# Patient Record
Sex: MALE | NOT HISPANIC OR LATINO
[De-identification: names, ages, dates, MRNs, and addresses within clinical notes are randomized per-mention and may not be internally consistent; named-entity substitution may affect disease eponyms.]

---

## 2019-01-04 ENCOUNTER — TRANSCRIPTION ENCOUNTER (OUTPATIENT)
Age: 39
End: 2019-01-04

## 2022-05-09 PROBLEM — Z00.00 ENCOUNTER FOR PREVENTIVE HEALTH EXAMINATION: Status: ACTIVE | Noted: 2022-05-09

## 2022-05-16 ENCOUNTER — TRANSCRIPTION ENCOUNTER (OUTPATIENT)
Age: 42
End: 2022-05-16

## 2022-05-16 ENCOUNTER — APPOINTMENT (OUTPATIENT)
Dept: VASCULAR SURGERY | Facility: CLINIC | Age: 42
End: 2022-05-16
Payer: COMMERCIAL

## 2022-05-16 VITALS
DIASTOLIC BLOOD PRESSURE: 79 MMHG | BODY MASS INDEX: 34.3 KG/M2 | SYSTOLIC BLOOD PRESSURE: 120 MMHG | HEART RATE: 81 BPM | WEIGHT: 245 LBS | HEIGHT: 71 IN

## 2022-05-16 DIAGNOSIS — I83.891 VARICOSE VEINS OF RIGHT LOWER EXTREMITY WITH OTHER COMPLICATIONS: ICD-10-CM

## 2022-05-16 DIAGNOSIS — I82.531 CHRONIC EMBOLISM AND THROMBOSIS OF RIGHT POPLITEAL VEIN: ICD-10-CM

## 2022-05-16 DIAGNOSIS — M25.561 PAIN IN RIGHT KNEE: ICD-10-CM

## 2022-05-16 DIAGNOSIS — Z78.9 OTHER SPECIFIED HEALTH STATUS: ICD-10-CM

## 2022-05-16 DIAGNOSIS — G89.29 PAIN IN RIGHT KNEE: ICD-10-CM

## 2022-05-16 PROCEDURE — 93971 EXTREMITY STUDY: CPT

## 2022-05-16 PROCEDURE — 99204 OFFICE O/P NEW MOD 45 MIN: CPT

## 2022-05-16 RX ORDER — OMEPRAZOLE 20 MG/1
20 CAPSULE, DELAYED RELEASE ORAL
Refills: 0 | Status: ACTIVE | COMMUNITY

## 2022-05-16 RX ORDER — IRON/IRON ASP GLY/FA/MV-MIN 38 125-25-1MG
TABLET ORAL
Refills: 0 | Status: ACTIVE | COMMUNITY

## 2022-05-16 RX ORDER — SERTRALINE HYDROCHLORIDE 150 MG/1
CAPSULE ORAL
Refills: 0 | Status: ACTIVE | COMMUNITY

## 2022-06-23 NOTE — PHYSICAL EXAM
[Normal Thyroid] : the thyroid was normal [Normal Breath Sounds] : Normal breath sounds [Respiratory Effort] : normal respiratory effort [Normal Heart Sounds] : normal heart sounds [Normal Rate and Rhythm] : normal rate and rhythm [2+] : left 2+ [Varicose Veins Of Lower Extremities] : present [Varicose Veins Of The Right Leg] : of the right leg [Ankle Swelling On The Left] : moderate [No HSM] : no hepatosplenomegaly [No Rash or Lesion] : No rash or lesion [Alert] : alert [Anxious] : anxious [JVD] : no jugular venous distention  [Carotid Bruits] : no carotid bruits [Right Carotid Bruit] : no bruit heard over the right carotid [Left Carotid Bruit] : no bruit heard over the left carotid [Ankle Swelling (On Exam)] : not present [] : not present [Abdomen Masses] : No abdominal masses [Abdomen Tenderness] : ~T ~M No abdominal tenderness [Purpura] : no purpura  [Petechiae] : no petechiae [Skin Ulcer] : no ulcer [Skin Induration] : no induration [de-identified] : Well-nourished, NAD [de-identified] : NC/AT, anicteric [de-identified] : No hernias noted b/l [de-identified] : FROM throughout, but pain w/flexion of the R knee and reproducible tenderness in the lateral R pop fossa, no palpable cords noted on exam [de-identified] : Neurovascular grossly intact in RLE

## 2022-06-23 NOTE — ADDENDUM
[FreeTextEntry1] : This note was written by Moisés Vargas, acting as a scribe for Dr. Anusha Meeks.  I, Dr. Anusha Meeks, have read and attest that all the information, medical decision-making, and discharge instructions within are true and accurate.\par \par I, Dr. Anusha Meeks, personally performed the evaluation and management (E/M) services for this new patient.  That E/M includes conducting the initial examination, assessing all conditions, and establishing the plan of care.  Today, my ACP, Moisés Vargas, was here to observe my evaluation and management services for this patient to be followed going forward.

## 2022-06-23 NOTE — PROCEDURE
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

## 2022-06-23 NOTE — HISTORY OF PRESENT ILLNESS
[FreeTextEntry1] : 41yoM w/h/o RLE DVT following a R knee arthroscopy/meniscus repair in  (treated w/DOAC), also s/p L THR, gastric sleeve, presents for chronic pain behind the R knee that began w/o cause in 2021 and has persisted since that time.  As the pain did not resolve initially w/rest, he presented for RLE duplex and was ruled in  for R popliteal vein DVT and treated for 3mos w/Xarelto.  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  His sister  of a DVT/PE years prior.  He denies any smoking hx or known cancer hx, and is relatively active, though he does have difficulty negotiating stairs due to his knee pain, and often finds great difficulty commuting to and from work on the train/subway.  Pt has large varicose veins of the R calf which are painful at times when standing, and are associated w/itching of the overlying skin and muscle pains underneath.  He denies swelling in the leg at this time.

## 2022-06-23 NOTE — ASSESSMENT
[FreeTextEntry1] : 41yoM w/h/o RLE DVT following a R knee arthroscopy/meniscus repair in 2019 (treated w/DOAC), also s/p L THR, gastric sleeve, presents for chronic pain behind the R knee that began w/o cause in 2021 and has persisted since that time.  As the pain did not resolve initially w/rest, he presented for RLE duplex and was ruled in  for R popliteal vein DVT and treated for 3mos w/Xarelto.  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  His sister  of a DVT/PE years prior.  He denies any smoking hx or known cancer hx, and is relatively active, though he does have difficulty negotiating stairs due to his knee pain, and often finds great difficulty commuting to and from work on the train/subway.  Pt has large varicose veins of the R calf which are painful at times when standing, and are associated w/itching of the overlying skin and muscle pains underneath.  He denies swelling in the leg at this time.\par \par On exam, legs well-perfused and pedal pulses easily palpable.  Large, soft, varicose veins of the R calf noted, no overlying ulcers or erythema appreciated.  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  Pt will f/u w/his orthopedist today, but would like to pursue intervention to treat his RLE varicose veins, regardless of whether it will improve his pain.  Instructed pt to begin daily compression stocking use, exercise legs daily, and elevate legs to prevent swelling.  Will plan for a RLE GSV RFA in office in an attempt to improve his symptoms related to his varicose veins.